# Patient Record
Sex: MALE | Race: WHITE | Employment: OTHER | URBAN - NONMETROPOLITAN AREA
[De-identification: names, ages, dates, MRNs, and addresses within clinical notes are randomized per-mention and may not be internally consistent; named-entity substitution may affect disease eponyms.]

---

## 2017-12-02 ENCOUNTER — HOSPITAL ENCOUNTER (EMERGENCY)
Age: 67
Discharge: HOME | End: 2017-12-02
Payer: MEDICARE

## 2017-12-02 VITALS
SYSTOLIC BLOOD PRESSURE: 132 MMHG | TEMPERATURE: 96.7 F | OXYGEN SATURATION: 97 % | RESPIRATION RATE: 18 BRPM | DIASTOLIC BLOOD PRESSURE: 84 MMHG | HEART RATE: 66 BPM

## 2017-12-02 DIAGNOSIS — S01.01XA LACERATION OF SCALP, INITIAL ENCOUNTER: Primary | ICD-10-CM

## 2017-12-02 PROCEDURE — 12002 RPR S/N/AX/GEN/TRNK2.6-7.5CM: CPT

## 2017-12-02 PROCEDURE — 99282 EMERGENCY DEPT VISIT SF MDM: CPT

## 2017-12-02 RX ORDER — VITAMIN E 268 MG
400 CAPSULE ORAL DAILY
COMMUNITY

## 2017-12-02 RX ORDER — TAMSULOSIN HYDROCHLORIDE 0.4 MG/1
0.4 CAPSULE ORAL DAILY
COMMUNITY

## 2017-12-02 RX ORDER — ASCORBIC ACID 500 MG
500 TABLET ORAL DAILY
COMMUNITY

## 2017-12-02 RX ORDER — BACITRACIN, NEOMYCIN, POLYMYXIN B 400; 3.5; 5 [USP'U]/G; MG/G; [USP'U]/G
OINTMENT TOPICAL 2 TIMES DAILY
Status: DISCONTINUED | OUTPATIENT
Start: 2017-12-02 | End: 2017-12-02 | Stop reason: HOSPADM

## 2017-12-02 ASSESSMENT — ENCOUNTER SYMPTOMS
CHEST TIGHTNESS: 0
NAUSEA: 0
BLOOD IN STOOL: 0
EYE REDNESS: 0
SHORTNESS OF BREATH: 0
EYE DISCHARGE: 0
VOMITING: 0
COUGH: 0
ABDOMINAL PAIN: 0
CONSTIPATION: 0
RHINORRHEA: 0
WHEEZING: 0
BACK PAIN: 0
SORE THROAT: 0
DIARRHEA: 0

## 2017-12-02 NOTE — ED PROVIDER NOTES
(up to 7 for level 4, 8 or more for level 5)     ED Triage Vitals [12/02/17 1307]   BP Temp Temp Source Pulse Resp SpO2 Height Weight   (!) 171/120 96.7 °F (35.9 °C) Tympanic 66 18 97 % -- --       Physical Exam   Constitutional: He is oriented to person, place, and time. He appears well-developed and well-nourished. No distress. HENT:   Head: Normocephalic. Head is with laceration. Right Ear: External ear normal.   Left Ear: External ear normal.   Eyes: Conjunctivae are normal. Right eye exhibits no discharge. Left eye exhibits no discharge. No scleral icterus. Neck: Normal range of motion and full passive range of motion without pain. Neck supple. No spinous process tenderness and no muscular tenderness present. No neck rigidity. No tracheal deviation, no edema, no erythema and normal range of motion present. Cardiovascular: Normal rate, regular rhythm and intact distal pulses. Pulmonary/Chest: Effort normal. No stridor. No respiratory distress. He has no wheezes. He has no rales. Musculoskeletal: Normal range of motion. He exhibits no edema, tenderness or deformity. Neurological: He is alert and oriented to person, place, and time. He has normal strength. He is not disoriented. No cranial nerve deficit or sensory deficit. He displays a negative Romberg sign. GCS eye subscore is 4. GCS verbal subscore is 5. GCS motor subscore is 6. Patient is awake alert and oriented to person place time situation. Cranial nerves II through XII grossly intact. 5 out of 5  strength bilateral upper extremity is. Skin: Skin is warm and dry. No rash noted. He is not diaphoretic. No erythema. Psychiatric: He has a normal mood and affect. His behavior is normal.   Nursing note and vitals reviewed.       DIAGNOSTIC RESULTS       EMERGENCY DEPARTMENT COURSE and DIFFERENTIAL DIAGNOSIS/MDM:   Vitals:    Vitals:    12/02/17 1307   BP: (!) 171/120   Pulse: 66   Resp: 18   Temp: 96.7 °F (35.9 °C)   TempSrc: Tympanic   SpO2: 97%         [facility]  70-year-old gentleman presents with laceration to top of this had onset just prior to arrival.  Patient was showing a house when he tripped on the step and hit his head on the wall. Denies any loss of consciousness. On exam does have about a 4 similar laceration. Depth is minimal.  There is no bony instability. He has no neurological deficits. Discussed with him CT had for further evaluation to rule out a bleed or skull fracture, foreign body patient declines a states that he feels well and just wants to be repaired and go home. He is up-to-date on his tetanus shot. He is a risk of not undergoing a CT second or to this wound. He is agreeable to this he understands the risk include bleeding and death. Will repair    Patient tolerated procedure well without any complications. He does understand strict and specific return warnings specifically pertaining to this head injury. He verbalized agreement is plan questions have been answered at length. Wound is then dressed with bacitracin. He will follow-up as instructed he will otherwise return immediately to the ER with any new or worsening complaints. He understands the same staples need to be removed in     Lac Repair  Date/Time: 12/2/2017 1:43 PM  Performed by: Jamee Dacosta by: Al Course     Consent:     Consent obtained:  Verbal    Consent given by:  Patient    Risks discussed:  Infection, need for additional repair, nerve damage, poor wound healing, poor cosmetic result, pain, retained foreign body, tendon damage and vascular damage    Alternatives discussed:  No treatment  Anesthesia (see MAR for exact dosages):      Anesthesia method:  None  Laceration details:     Location:  Scalp    Scalp location:  Mid-scalp    Length (cm):  4  Repair type:     Repair type:  Simple  Pre-procedure details:     Preparation:  Patient was prepped and draped in usual sterile fashion  Exploration:     Hemostasis

## 2018-01-01 ENCOUNTER — HOSPITAL ENCOUNTER (EMERGENCY)
Age: 68
Discharge: HOME | End: 2018-01-01
Attending: EMERGENCY MEDICINE
Payer: MEDICARE

## 2018-01-01 VITALS
HEART RATE: 58 BPM | OXYGEN SATURATION: 100 % | SYSTOLIC BLOOD PRESSURE: 138 MMHG | TEMPERATURE: 97.3 F | RESPIRATION RATE: 16 BRPM | DIASTOLIC BLOOD PRESSURE: 83 MMHG

## 2018-01-01 DIAGNOSIS — N39.0 URINARY TRACT INFECTION WITHOUT HEMATURIA, SITE UNSPECIFIED: Primary | ICD-10-CM

## 2018-01-01 LAB
-: ABNORMAL
AMORPHOUS: ABNORMAL
BACTERIA: ABNORMAL
BILIRUBIN URINE: ABNORMAL
CASTS UA: ABNORMAL /LPF
COLOR: YELLOW
COMMENT UA: ABNORMAL
CRYSTALS, UA: ABNORMAL /HPF
EPITHELIAL CELLS UA: ABNORMAL /HPF (ref 0–5)
GLUCOSE URINE: NEGATIVE
KETONES, URINE: ABNORMAL
LEUKOCYTE ESTERASE, URINE: ABNORMAL
MUCUS: ABNORMAL
NITRITE, URINE: POSITIVE
OTHER OBSERVATIONS UA: ABNORMAL
PH UA: 5 (ref 5–9)
PROTEIN UA: ABNORMAL
RBC UA: ABNORMAL /HPF (ref 0–2)
RENAL EPITHELIAL, UA: ABNORMAL /HPF
SPECIFIC GRAVITY UA: >1.03 (ref 1.01–1.02)
TRICHOMONAS: ABNORMAL
TURBIDITY: ABNORMAL
URINE HGB: ABNORMAL
UROBILINOGEN, URINE: NORMAL
WBC UA: ABNORMAL /HPF (ref 0–5)
YEAST: ABNORMAL

## 2018-01-01 PROCEDURE — 87186 SC STD MICRODIL/AGAR DIL: CPT

## 2018-01-01 PROCEDURE — 87088 URINE BACTERIA CULTURE: CPT

## 2018-01-01 PROCEDURE — 6360000002 HC RX W HCPCS: Performed by: PHYSICIAN ASSISTANT

## 2018-01-01 PROCEDURE — 81001 URINALYSIS AUTO W/SCOPE: CPT

## 2018-01-01 PROCEDURE — 87086 URINE CULTURE/COLONY COUNT: CPT

## 2018-01-01 PROCEDURE — 99283 EMERGENCY DEPT VISIT LOW MDM: CPT

## 2018-01-01 PROCEDURE — 96372 THER/PROPH/DIAG INJ SC/IM: CPT

## 2018-01-01 RX ORDER — CEPHALEXIN 500 MG/1
500 CAPSULE ORAL 3 TIMES DAILY
Qty: 21 CAPSULE | Refills: 0 | Status: SHIPPED | OUTPATIENT
Start: 2018-01-01 | End: 2018-01-08

## 2018-01-01 RX ORDER — CEFTRIAXONE 500 MG/1
500 INJECTION, POWDER, FOR SOLUTION INTRAMUSCULAR; INTRAVENOUS ONCE
Status: COMPLETED | OUTPATIENT
Start: 2018-01-01 | End: 2018-01-01

## 2018-01-01 RX ADMIN — CEFTRIAXONE SODIUM 500 MG: 500 INJECTION, POWDER, FOR SOLUTION INTRAMUSCULAR; INTRAVENOUS at 13:41

## 2018-01-01 ASSESSMENT — ENCOUNTER SYMPTOMS
NAUSEA: 0
TROUBLE SWALLOWING: 0
VOMITING: 0
RHINORRHEA: 0
SINUS PRESSURE: 0
DIARRHEA: 0
BACK PAIN: 0
EYE DISCHARGE: 0
COUGH: 0
EYE PAIN: 0
WHEEZING: 0
ABDOMINAL PAIN: 0

## 2018-01-01 NOTE — ED PROVIDER NOTES
is no rebound. Musculoskeletal: He exhibits no edema. Lymphadenopathy:     He has no cervical adenopathy. Neurological: He is alert and oriented to person, place, and time. No cranial nerve deficit. Skin: No rash noted. Psychiatric: He has a normal mood and affect. His behavior is normal.   Nursing note and vitals reviewed. DIAGNOSTIC RESULTS     EKG: (none if blank)  All EKG's are interpreted by the Emergency Department Physician who either signs or Co-signs this chart in the absence of a cardiologist.      RADIOLOGY: (none if blank)   Interpretation per the Radiologist below, if available at the time of this note:    No orders to display       LABS:  Labs Reviewed   UA W/REFLEX CULTURE - Abnormal; Notable for the following:        Result Value    Turbidity UA TURBID (*)     Bilirubin Urine MODERATE (*)     Ketones, Urine TRACE (*)     Specific Gravity, UA >1.030 (*)     Urine Hgb 3+ (*)     Protein, UA 3+ (*)     Nitrite, Urine POSITIVE (*)     Leukocyte Esterase, Urine MODERATE (*)     All other components within normal limits   MICROSCOPIC URINALYSIS - Abnormal; Notable for the following:     Bacteria, UA 3+ (*)     All other components within normal limits   URINE CULTURE       All other labs were within normal range or not returned as of this dictation. EMERGENCY DEPARTMENT COURSE and Medical Decision Making:     MDM/  ED Course      Patient was given his results. He requested a shot of antibiotics was given Rocephin. He refuses any pain medication. Strict return precautions and follow up instructions were discussed with the patient with which the patient agrees    CONSULTS: (None if blank)  None    Procedures: (None if blank)       CLINICAL IMPRESSION      1.  Urinary tract infection without hematuria, site unspecified          DISPOSITION/PLAN   DISPOSITION Discharge - Pending Orders Complete 01/01/2018 01:19:44 PM      PATIENT REFERRED TO:  MD Eduarda Hatch [address]    In 1 week        DISCHARGE MEDICATIONS:  New Prescriptions    CEPHALEXIN (KEFLEX) 500 MG CAPSULE    Take 1 capsule by mouth 3 times daily for 7 days              (Please note that portions of this note were completed with a voice recognition program.  Efforts were made to edit the dictations but occasionally words are mis-transcribed.)      Karyle Coaster May, PA-C (electronically signed)  Attending Emergency Physician            Karyle Coaster May, PA-C  01/01/18 [id number]

## 2018-01-03 LAB
CULTURE: ABNORMAL
CULTURE: ABNORMAL
Lab: ABNORMAL
ORGANISM: ABNORMAL
SPECIMEN DESCRIPTION: ABNORMAL
SPECIMEN DESCRIPTION: ABNORMAL
STATUS: ABNORMAL

## 2018-11-12 ENCOUNTER — HOSPITAL ENCOUNTER (OUTPATIENT)
Age: 68
Discharge: HOME | End: 2018-11-12
Payer: MEDICARE

## 2018-11-12 PROCEDURE — 84154 ASSAY OF PSA FREE: CPT

## 2018-11-12 PROCEDURE — 36415 COLL VENOUS BLD VENIPUNCTURE: CPT

## 2018-11-13 LAB
PROSTATE SPECIFIC ANTIGEN FREE: 0.9 UG/L
PROSTATE SPECIFIC ANTIGEN PERCENT FREE: 7 %
PROSTATE SPECIFIC ANTIGEN: 12.9 UG/L (ref 0–4)

## 2019-10-22 ENCOUNTER — HOSPITAL ENCOUNTER (EMERGENCY)
Age: 69
Discharge: HOME | End: 2019-10-22
Attending: EMERGENCY MEDICINE
Payer: MEDICARE

## 2019-10-22 VITALS
WEIGHT: 272 LBS | OXYGEN SATURATION: 97 % | DIASTOLIC BLOOD PRESSURE: 84 MMHG | RESPIRATION RATE: 16 BRPM | SYSTOLIC BLOOD PRESSURE: 124 MMHG | HEART RATE: 64 BPM | TEMPERATURE: 99.8 F

## 2019-10-22 DIAGNOSIS — N30.00 ACUTE CYSTITIS WITHOUT HEMATURIA: Primary | ICD-10-CM

## 2019-10-22 DIAGNOSIS — R33.9 URINARY RETENTION: ICD-10-CM

## 2019-10-22 LAB
-: ABNORMAL
AMORPHOUS: ABNORMAL
BACTERIA: ABNORMAL
BILIRUBIN URINE: NEGATIVE
CASTS UA: ABNORMAL /LPF
COLOR: YELLOW
COMMENT UA: ABNORMAL
CRYSTALS, UA: ABNORMAL /HPF
EPITHELIAL CELLS UA: ABNORMAL /HPF (ref 0–5)
GLUCOSE URINE: ABNORMAL
KETONES, URINE: NEGATIVE
LEUKOCYTE ESTERASE, URINE: ABNORMAL
MUCUS: ABNORMAL
NITRITE, URINE: NEGATIVE
OTHER OBSERVATIONS UA: ABNORMAL
PH UA: 6 (ref 5–9)
PROTEIN UA: ABNORMAL
RBC UA: ABNORMAL /HPF (ref 0–2)
RENAL EPITHELIAL, UA: ABNORMAL /HPF
SPECIFIC GRAVITY UA: 1.02 (ref 1.01–1.02)
TRICHOMONAS: ABNORMAL
TURBIDITY: ABNORMAL
URINE HGB: ABNORMAL
UROBILINOGEN, URINE: NORMAL
WBC UA: ABNORMAL /HPF (ref 0–5)
YEAST: ABNORMAL

## 2019-10-22 PROCEDURE — 99283 EMERGENCY DEPT VISIT LOW MDM: CPT

## 2019-10-22 PROCEDURE — 87086 URINE CULTURE/COLONY COUNT: CPT

## 2019-10-22 PROCEDURE — 81001 URINALYSIS AUTO W/SCOPE: CPT

## 2019-10-22 PROCEDURE — 96372 THER/PROPH/DIAG INJ SC/IM: CPT

## 2019-10-22 PROCEDURE — 6360000002 HC RX W HCPCS: Performed by: EMERGENCY MEDICINE

## 2019-10-22 PROCEDURE — 87077 CULTURE AEROBIC IDENTIFY: CPT

## 2019-10-22 PROCEDURE — 87186 SC STD MICRODIL/AGAR DIL: CPT

## 2019-10-22 RX ORDER — CEFTRIAXONE 1 G/1
1 INJECTION, POWDER, FOR SOLUTION INTRAMUSCULAR; INTRAVENOUS ONCE
Status: COMPLETED | OUTPATIENT
Start: 2019-10-22 | End: 2019-10-22

## 2019-10-22 RX ORDER — CIPROFLOXACIN 500 MG/1
500 TABLET, FILM COATED ORAL 2 TIMES DAILY
Qty: 20 TABLET | Refills: 0 | Status: SHIPPED | OUTPATIENT
Start: 2019-10-22 | End: 2019-11-01

## 2019-10-22 RX ADMIN — CEFTRIAXONE 1 G: 1 INJECTION, POWDER, FOR SOLUTION INTRAMUSCULAR; INTRAVENOUS at 16:04

## 2019-10-22 ASSESSMENT — PAIN SCALES - GENERAL: PAINLEVEL_OUTOF10: 9

## 2019-10-22 ASSESSMENT — ENCOUNTER SYMPTOMS
SHORTNESS OF BREATH: 0
BACK PAIN: 0
ABDOMINAL DISTENTION: 0
VOMITING: 0
COUGH: 0
CONSTIPATION: 0
COLOR CHANGE: 0
WHEEZING: 0
ABDOMINAL PAIN: 0

## 2019-10-22 ASSESSMENT — PAIN DESCRIPTION - DESCRIPTORS: DESCRIPTORS: ACHING

## 2019-10-22 ASSESSMENT — PAIN DESCRIPTION - PAIN TYPE: TYPE: ACUTE PAIN

## 2019-10-24 LAB
CULTURE: ABNORMAL
Lab: ABNORMAL
SPECIMEN DESCRIPTION: ABNORMAL

## 2023-03-07 ENCOUNTER — HOSPITAL ENCOUNTER (OUTPATIENT)
Dept: PHYSICAL THERAPY | Age: 73
Setting detail: THERAPIES SERIES
Discharge: HOME | End: 2023-03-07
Payer: MEDICARE

## 2023-03-07 DIAGNOSIS — M54.12 CERVICAL RADICULOPATHY: Primary | ICD-10-CM

## 2023-03-07 PROCEDURE — 97110 THERAPEUTIC EXERCISES: CPT

## 2023-03-07 PROCEDURE — 97161 PT EVAL LOW COMPLEX 20 MIN: CPT

## 2023-03-08 NOTE — PLAN OF CARE
[facility]           Phone: [phone]             Outpatient Physical Therapy  Fax: [phone]                                           Date: 3/7/2023  Patient: Vicente Mello : 1950 [facility] #: [id number]   Referring Physician: Silvio Malagon MD      [x] Plan of Care   [] Updated Plan of Care    Dates of Service to Include: 3/7/2023 to 23    Diagnosis:  M54.12 - cervical radiculopathy    Rehab (Treatment) Diagnosis:  neck pain, left shoulder pain, weakness             Onset Date:  23    Attendance  Total # of Visits to Date: 1 No Show: 0 Canceled Appointment: 0    Assessment  Body Structures, Functions, Activity Limitations Requiring Skilled Therapeutic Intervention: Decreased functional mobility , Decreased tolerance to work activity, Decreased posture, Increased pain, Decreased high-level IADLs, Decreased endurance, Decreased strength, Decreased ROM  Assessment: Pt is a 72 year old male being evaluated for skilled outpatient physical therapy for complaints of neck and left shoulder pain. Pt demosntrating reduction in cervical mobility with radicular symptoms to the left elbow and forearm. Pt denies radicular symptoms to the hand and fingers. Left upper extremity weakness noted compared to right UE, however pt presenting with bilateral weakness of the upper extremities. At this time pt to benefit from skilled outpatient physical therapy 2x per week for 6 weeks in order to faciltiate improved mobility and strength of upper extremities, cervical spine and postural muscles as needed for return to reported functional outcome.    Objective measures:   Strength RUE  R Shoulder Flexion: 4+/5  R Shoulder ABduction: 5/5  R Shoulder Internal Rotation: 4+/5  R Shoulder External Rotation: 4+/5  R Elbow Flexion: 5/5  R Elbow Extension: 5/5     Strength LUE  L Shoulder Flexion: 4/5  L Shoulder ABduction:  5/5  L Shoulder Internal Rotation: 4+/5  L Shoulder External Rotation: 4-/5  L Elbow Flexion: 5/5  L Elbow Extension: 4+/5    Special Tests:   Special Tests for Cervical Spine  Cervical Distraction Test: (-)  Impingement Tests  Neer Test: R (-), L (-)  Hawkin’s Gavin Test: L (+), R (-)  Lift-Off Test: L (+)  Yergason’s Test: L (+)  Labral Tests  O’Aram’s Test (Labrum): L (-), R (-)  Crank Test (Labrum): L (-), R (-)    Goals  Short Term Goals  Time Frame for Short Term Goals: 2 weeks  Short Term Goal 1: Pt will be educated on and initiated HEP for cervical and scapular mobility - met  Short Term Goal 2: Pt will tolerate 25 minutes of activity with pain less than 5/10 in order to improve ability to complete household tasks  Short Term Goal 3: Initate manual therapy as needed for tissue extensibility and reduction in stiffness as needed for improved mobility  Long Term Goals  Time Frame for Long Term Goals : 6 weeks  Long Term Goal 1: Pt will be independent and compliant with advanced HEP in order to self manage symptoms upon discharge  Long Term Goal 2: Pt will improve upper extremity strength to at least 5-/5 in order to improve ability to lift objects overhead  Long Term Goal 3: Pt will improve cervical rotation AROM to greater than 65 degrees in order to improve ability to look out side car window  Long Term Goal 4: Pt will improve NDI to less than 10 in order to improve ability to complete activities of daily living     Prognosis  Therapy Prognosis: Fair    Treatment Plan   Plan Frequency: 2x per week  Plan weeks: 4 weeks  [x] HP/CP      [x] Electrical Stim   [x] Therapeutic Exercise      [] Gait Training  [] Aquatics   [] Ultrasound         [x] Patient Education/HEP   [x] Manual Therapy  [] Traction    [x] Neuro-vaibhav        [x] Soft Tissue Mobs            [] Home TENS  [] Iontophoresis    [] Orthotic casting/fitting      [] Dry Needling  [] Blood Flow Restriction             Electronically signed by: Freya  Mark Mcfarland PT, DPT    Date: 3/7/2023      ______________________________________ Date: 3/7/2023   Physician Signature

## 2023-03-08 NOTE — PROGRESS NOTES
Phone: [address]          Fax: [phone]                      Outpatient Physical Therapy                                                                      Evaluation  Date: 3/7/2023  Patient: Odalis Mendieta  : 1950  [facility] #: [id number]    Referring Physician: Abhay Sanabria MD     Medical Diagnosis: M57.52 - cervical radiculopathy    Treatment Diagnosis: neck pain, left shoulder pain, weakness  Onset Date: 23  PT Insurance Information: [place] [facility] [place]  Total # of Visits Approved: 12   Total # of Visits to Date: 1  No Show: 0  Canceled Appointment: 0     Subjective  Subjective: Pt arrives with reports of neck and left shoulder pain that impacts his sleep and his ability get up from the bed due to pain. Pt has history of bilateral rotator cuff injuries. Pt reports pain moves from neck to, shoulder blade, and down to the left elbow. Pt reports radicular symptoms go down distal to elbow, denies radicular symptoms in to the hand and finger. Pt describes pain as dully, achy. Denies numbness and tingling. Pt reports pain at rest 5-6/10. Pt reports pain with movement 7/10. Pt biggest complaint is lack of strength preventing him from reaching overhead. Pt reports having not tried anything for pain management.   Additional Pertinent Hx: vision problems     Palpation:   Cervical Spine Palpation: pt reports mild tenderness to palpation of left upper trap, hypomobile segments left to right side glides  Right Shoulder Palpation: pt denies tenderness to palpation  Left Shoulder Palpation: pt denies tenderness to palpation      Objective    AROM       AROM LUE (degrees)  L Shoulder Flexion (0-180): 170  L Shoulder ABduction (0-180): 167  L Shoulder Int Rotation  (0-70): T12  L Shoulder Ext Rotation  (0-90): Suboccipital  L Elbow Flexion (0-145): WFL  L Elbow Extension (145-0): WFL      AROM RUE (degrees)  R Shoulder Flexion: 168  R Shoulder ABduction: 160  R Shoulder Internal Rotation: L3   R Shoulder External Rotation: T1  R Elbow Flexion: WFL  R Elbow Extension: [facility]         Strength       Strength RUE  R Shoulder Flexion: 4+/5  R Shoulder ABduction: 5/5  R Shoulder Internal Rotation: 4+/5  R Shoulder External Rotation: 4+/5  R Elbow Flexion: 5/5  R Elbow Extension: 5/5    Strength LUE  L Shoulder Flexion: 4/5  L Shoulder ABduction: 5/5  L Shoulder Internal Rotation: 4+/5  L Shoulder External Rotation: 4-/5  L Elbow Flexion: 5/5  L Elbow Extension: 4+/5       Cervical Assessment     AROM Cervical Spine   Measured as: Degrees  Cervical flexion: 49  Cervical extension: 40  Cervical right lateral: 15  Cervical left lateral: 20  Cervical right rotation: 50  Cervical left rotation: 38       Muscle Length/Flexibility: Muscle Length UE  Right Upper Trapezius: Tight  Left Upper Trapezius: Tight    Joint Mobility (if applicable):   Joint Integrity Cervical/Thoracic  C3: Hypomobile  C4: Hypomobile  C5: Hypomobile  C6: Hypomobile  C7: Hypomobile  Joint Integrity Shoulder  Left AP GHJ Glide: Hypomobile  Left PA GHJ Glide: Hypomobile  Left Inferior GHJ Glide: Hypomobile  Left Distal ACJ Posterior Glide: Hypomobile  Left Distal ACJ Inferior Glide: Hypomobile  Left Scapulothoracic Mobility: Hypomobile    Special Tests:   Special Tests for Cervical Spine  Cervical Distraction Test: (-)  Impingement Tests  Neer Test: R (-), L (-)  Hawkins Gavin Test: L (+), R (-)  Lift-Off Test: L (+)  Yergasons Test: L (+)  Labral Tests  OArams Test (Labrum): L (-), R (-)  Crank Test (Labrum): L (-), R (-)                        Exercises:  Exercise 1: HEP: Supine AROM cervical rotation, Seated hervical rotation with nod, scaption wall slide with towel, no money (no resistance)            Functional Outcome Measures     Section 1: Pain Intensity: The pain is moderate at the moment  Section 2: Personal Care (Washing, Dressing, etc.): I can look after myself normally but it causes extra pain  Section 3: Lifting: Pain prevents me lifting heavy weights off the floor, but I can manage if they are conveniently positioned, for example on a table  Section 4: Reading: I can read as much as I want to with slight pain in my neck  Section 5: Headaches: I have no headaches at all  Section 6: Concentration: I can concentrate fully when I want to with no difficulty  Section 7: Work: I can do most of my usual work, but no more  Section 8: Driving: I can drive my car as long as I want with moderate pain in my neck  Section 9: Sleeping: My sleep is mildly disturbed (1-2 hours sleepless)  Section 10: Recreation: I am able to engage in most, but not all, of my usual recreation activities because of pain in my neck  Neck Disability Index Raw Score: 14                                                    Assessment  Body Structures, Functions, Activity Limitations Requiring Skilled Therapeutic Intervention: Decreased functional mobility , Decreased tolerance to work activity, Decreased posture, Increased pain, Decreased high-level IADLs, Decreased endurance, Decreased strength, Decreased ROM  Assessment: Pt is a 67year old male being evaluated for skilled outpatient physical therapy for complaints of neck and left shoulder pain. Pt demosntrating reduction in cervical mobility with radicular symptoms to the left elbow and forearm. Pt denies radicular symptoms to the hand and fingers. Left upper extremity weakness noted compared to right UE, however pt presenting with bilateral weakness of the upper extremities. At this time pt to benefit from skilled outpatient physical therapy 2x per week for 6 weeks in order to faciltiate improved mobility and strength of upper extremities, cervical spine and postural muscles as needed for return to reported functional outcome.   Therapy Prognosis: Fair        Decision Making: Low Complexity    Patient Education  Patient Education: Pt educated on HEP and plan of care  Pt verbalized/demonstrated good understanding:     [X] Yes         [] No, pt required further clarification.       Goals  Short Term Goals  Time Frame for Short Term Goals: 2 weeks  Short Term Goal 1: Pt will be educated on and initiated HEP for cervical and scapular mobility - met  Short Term Goal 2: Pt will tolerate 25 minutes of activity with pain less than 5/10 in order to improve ability to complete household tasks  Short Term Goal 3: Initate manual therapy as needed for tissue extensibility and reduction in stiffness as needed for improved mobility    Long Term Goals  Time Frame for Long Term Goals : 6 weeks  Long Term Goal 1: Pt will be independent and compliant with advanced HEP in order to self manage symptoms upon discharge  Long Term Goal 2: Pt will improve upper extremity strength to at least 5-/5 in order to improve ability to lift objects overhead  Long Term Goal 3: Pt will improve cervical rotation AROM to greater than 65 degrees in order to improve ability to look out side car window  Long Term Goal 4: Pt will improve NDI to less than 10 in order to improve ability to complete activities of daily living      Patient Goals : get better golf swing        Minutes Tracking:  Time In: 0845  Time Out: 0935  Minutes: 50  Timed Code Treatment Minutes: [address], [place], DPT    3/7/2023

## 2023-03-14 ENCOUNTER — HOSPITAL ENCOUNTER (OUTPATIENT)
Dept: PHYSICAL THERAPY | Age: 73
Setting detail: THERAPIES SERIES
Discharge: HOME | End: 2023-03-14
Payer: MEDICARE

## 2023-03-14 PROCEDURE — 97110 THERAPEUTIC EXERCISES: CPT

## 2023-03-14 NOTE — PROGRESS NOTES
Phone: [phone]                 [facility]           Fax: [phone]                           Outpatient Physical Therapy                                                                            Daily Note    Patient: Vicente Mello : 1950  [facility] #: [id number]   Referring Physician: Silvio Malagon MD    Date: 3/14/2023    Diagnosis: M54.12 - cervical radiculopathy  Treatment Diagnosis: neck pain, left shoulder pain, weakness    Onset Date: 23  PT Insurance Information: Medicare Part A and B / [employer]  Total # of Visits Approved: 12 Per Physician Order  Total # of Visits to Date: 2  No Show: 0  Canceled Appointment: 0      Pre-Treatment Pain:  3/10  Subjective: Pt arrives with reports of mild pain in the left upper extremity. Denies neck pain    Exercises:  Exercise 1: HEP: Supine AROM cervical rotation, Seated cervical rotation with nod, scaption wall slide with towel, no money (no resistance)  Exercise 2: UBE level 1 backwards 7 minutes  Exercise 3: Upper trap stretch 2 x 30 seconds bilateral ; levator scapula stretch 2 x 30 seconds  Exercise 4: shoulder blade squeeze 2 x 10  Exercise 5: Seated cervical rotation with nod 3 x 10 bilateral  Exercise 6: no monies 3 x 10  Exercise 7: Scaption wall slide with pillow case 3 x 10  Exercise 8: ball roll out 30x  Exercise 9: supine cervical rotation 3 x 10  Exercise 10: AROM supine shoulder flexion 3 pound dowel 3 x 10         Modalities:   10 minutes - heat pack to cervical spine and bilateral shoulders in order to facilitate tissue extensibility and reduce post treatment soreness     Assessment  Body Structures, Functions, Activity Limitations Requiring Skilled Therapeutic Intervention: Decreased functional mobility , Decreased tolerance to work activity, Decreased posture, Increased pain, Decreased high-level IADLs, Decreased endurance, Decreased strength, Decreased ROM  Assessment: Pt arrived with reports of mild pain. Therapist  directed pt through treatment in order to faciliate postural strength and mobility within pain free range of motion. Pt able to perform all exercises without increase in pain. Mild fatigue noted with increased repetitions that reduced at rest. Therapist provided verbal cuing for form and visual demonstration with new exercises. Therapist ended treatment with use of heat pack to cervical spine and bilateral shoulders in order to faciltate tissue extensibility and reduce post treatment soreness. Pt ended treatment without pain. Activity Tolerance  Activity Tolerance: Patient tolerated evaluation without incident    Patient Education  Patient Education: Pt educated on updated exercises  Pt verbalized/demonstrated good understanding:     [x] Yes         [] No, pt required further clarification.        Post Treatment Pain:  0/10      Plan  Plan Frequency: 2x per week  Plan weeks: 4 weeks       Goals  (Total # of Visits to Date: 2)      Short Term Goals  Time Frame for Short Term Goals: 2 weeks  Short Term Goal 1: Pt will be educated on and initiated HEP for cervical and scapular mobility - met  Short Term Goal 2: Pt will tolerate 25 minutes of activity with pain less than 5/10 in order to improve ability to complete household tasks  Short Term Goal 3: Initate manual therapy as needed for tissue extensibility and reduction in stiffness as needed for improved mobility    Long Term Goals  Time Frame for Long Term Goals : 6 weeks  Long Term Goal 1: Pt will be independent and compliant with advanced HEP in order to self manage symptoms upon discharge  Long Term Goal 2: Pt will improve upper extremity strength to at least 5-/5 in order to improve ability to lift objects overhead  Long Term Goal 3: Pt will improve cervical rotation AROM to greater than 65 degrees in order to improve ability to look out side car window  Long Term Goal 4: Pt will improve NDI to less than 10 in order to improve ability to complete activities of daily living    Minutes Tracking:  Time In: 0830  Time Out: [facility]  Minutes: 55  Timed Code Treatment Minutes: [address], [place], DP     Date: 3/14/2023

## 2023-03-17 ENCOUNTER — HOSPITAL ENCOUNTER (OUTPATIENT)
Dept: PHYSICAL THERAPY | Age: 73
Setting detail: THERAPIES SERIES
Discharge: HOME | End: 2023-03-17
Payer: MEDICARE

## 2023-03-17 PROCEDURE — 97110 THERAPEUTIC EXERCISES: CPT

## 2023-03-17 PROCEDURE — 97140 MANUAL THERAPY 1/> REGIONS: CPT

## 2023-03-21 ENCOUNTER — HOSPITAL ENCOUNTER (OUTPATIENT)
Dept: PHYSICAL THERAPY | Age: 73
Setting detail: THERAPIES SERIES
Discharge: HOME | End: 2023-03-21
Payer: MEDICARE

## 2023-03-21 PROCEDURE — 97110 THERAPEUTIC EXERCISES: CPT

## 2023-03-21 NOTE — PROGRESS NOTES
Phone: [place]           Fax: [phone]                           Outpatient Physical Therapy                                                                            Daily Note    Patient: Rodolfo Aguilar : 1950  [facility] #: [id number]   Referring Physician: Army Abbie MD    Date: 3/21/2023    Diagnosis: M54.12 - cervical radiculopathy  Treatment Diagnosis: neck pain, left shoulder pain, weakness    Onset Date: 23  PT Insurance Information: Medicare Part A and B / [facility] [place]  Total # of Visits Approved: 12 Per Physician Order  Total # of Visits to Date: 4  No Show: 0  Canceled Appointment: 0      Pre-Treatment Pain:  2/10  Subjective: \"oh it's the same old thing, I dont think anything will make it better\". Pt reports usual pain and stiffness in neck and shoulder    Exercises:  Exercise 2: UBE level 1 backwards 6 minutes  Exercise 5: Seated cervical rotation with nod 3 x 10 bilateral  Exercise 6: no monies 3 x 10  Exercise 7: Scaption wall slide with pillow case 3 x 10  Exercise 8: ball roll out 30x  Exercise 11: Shoulder extension YTB 3 x 10 in reduced ROM  Exercise 12: Ball pass behind 3 x 15 CW / CCW  Exercise 13: joystick LUE only forward / back ; figure eight 30x each       Assessment  Body Structures, Functions, Activity Limitations Requiring Skilled Therapeutic Intervention: Decreased functional mobility , Decreased tolerance to work activity, Decreased posture, Increased pain, Decreased high-level IADLs, Decreased endurance, Decreased strength, Decreased ROM  Assessment: Pt arrivees with usualy reports of mild pain. Therapist continued to direct pt through treatment in order to facilitate left UE mobility and strength within current mobility. Pt able to demonstrate full flexion range of motion with active assist from RUE. Therapist able to progress pt with new exercises per activity log with good pt tolerance.  Therapist provided verbal cuing and

## 2023-03-24 ENCOUNTER — HOSPITAL ENCOUNTER (OUTPATIENT)
Dept: PHYSICAL THERAPY | Age: 73
Setting detail: THERAPIES SERIES
Discharge: HOME | End: 2023-03-24
Payer: MEDICARE

## 2023-03-24 PROCEDURE — 97110 THERAPEUTIC EXERCISES: CPT

## 2023-03-24 PROCEDURE — 97140 MANUAL THERAPY 1/> REGIONS: CPT

## 2023-03-24 NOTE — PROGRESS NOTES
will be educated on and initiated HEP for cervical and scapular mobility - met  Short Term Goal 2: Pt will tolerate 25 minutes of activity with pain less than 5/10 in order to improve ability to complete household tasks - met  Short Term Goal 3: Initate manual therapy as needed for tissue extensibility and reduction in stiffness as needed for improved mobility -MET    Long Term Goals  Time Frame for Long Term Goals : 6 weeks  Long Term Goal 1: Pt will be independent and compliant with advanced HEP in order to self manage symptoms upon discharge  Long Term Goal 2: Pt will improve upper extremity strength to at least 5-/5 in order to improve ability to lift objects overhead  Long Term Goal 3: Pt will improve cervical rotation AROM to greater than 65 degrees in order to improve ability to look out side car window  Long Term Goal 4: Pt will improve NDI to less than 10 in order to improve ability to complete activities of daily living    Minutes Tracking:  Time In: 0830  Time Out: 0900  Minutes: 30  Timed Code Treatment Minutes: Juanis Ac [address] [place] [address]     Date: 3/24/2023

## 2023-03-28 ENCOUNTER — HOSPITAL ENCOUNTER (OUTPATIENT)
Dept: PHYSICAL THERAPY | Age: 73
Setting detail: THERAPIES SERIES
Discharge: HOME | End: 2023-03-28
Payer: MEDICARE

## 2023-03-28 PROCEDURE — 97110 THERAPEUTIC EXERCISES: CPT

## 2023-03-28 PROCEDURE — 97140 MANUAL THERAPY 1/> REGIONS: CPT

## 2023-03-28 NOTE — PROGRESS NOTES
not. Added thoracic extensions to program as well with no adverse response. Plan to continue to progress pt as tolerated. Activity Tolerance  Activity Tolerance: Patient tolerated treatment well    Patient Education  Patient Education: Contiue CROM exercises at home within tolerance. Pt verbalized/demonstrated good understanding:     [x] Yes         [] No, pt required further clarification.        Post Treatment Pain:  0/10      Plan  Plan Frequency: 2x per week  Plan weeks: 4 weeks       Goals  (Total # of Visits to Date: 6)      Short Term Goals  Time Frame for Short Term Goals: 2 weeks  Short Term Goal 1: Pt will be educated on and initiated HEP for cervical and scapular mobility - met  Short Term Goal 2: Pt will tolerate 25 minutes of activity with pain less than 5/10 in order to improve ability to complete household tasks - met  Short Term Goal 3: Initate manual therapy as needed for tissue extensibility and reduction in stiffness as needed for improved mobility -MET    Long Term Goals  Time Frame for Long Term Goals : 6 weeks  Long Term Goal 1: Pt will be independent and compliant with advanced HEP in order to self manage symptoms upon discharge  Long Term Goal 2: Pt will improve upper extremity strength to at least 5-/5 in order to improve ability to lift objects overhead  Long Term Goal 3: Pt will improve cervical rotation AROM to greater than 65 degrees in order to improve ability to look out side car window  Long Term Goal 4: Pt will improve NDI to less than 10 in order to improve ability to complete activities of daily living    Minutes Tracking:  Time In: 0830  Time Out: 0915  Minutes: 45  Timed Code Treatment Minutes: [address], PT     Date: 3/28/2023

## 2023-03-31 ENCOUNTER — HOSPITAL ENCOUNTER (OUTPATIENT)
Dept: PHYSICAL THERAPY | Age: 73
Setting detail: THERAPIES SERIES
Discharge: HOME | End: 2023-03-31
Payer: MEDICARE

## 2023-03-31 PROCEDURE — 97110 THERAPEUTIC EXERCISES: CPT

## 2023-03-31 PROCEDURE — 97140 MANUAL THERAPY 1/> REGIONS: CPT

## 2023-03-31 NOTE — PROGRESS NOTES
Phone: Mercedes           Fax: [phone]                           Outpatient Physical Therapy                                                                            Daily Note    Patient: Saurabh Morse : 1950  CSN #: [id number]   Referring Physician: Areli Valadez MD    Date: 3/31/2023       Treatment Diagnosis: neck pain, left shoulder pain, weakness    Onset Date: 23  PT Insurance Information: Medicare Part A and B / [employer]  Total # of Visits Approved: 12 Per Physician Order  Total # of Visits to Date: 7  No Show: 0  Canceled Appointment: 0    Pre-Treatment Pain:  0/10  Subjective: Pt reports he has made some improvements since begining therapy but \"its still there\". Pt rates current pain a 0 at rest but catches with motion. Exercises:  Exercise 2: UBE level 1 backwards 6 minutes  Exercise 3: Upper trap stretch 2 x 30 seconds bilateral ; levator scapula stretch 2 x 30 seconds  Exercise 5: Seated cervical rotation with nod 3 x 10 bilateral  Exercise 9: supine cervical rotation 3 x 10  Exercise 14: yes/no 15x ea    Manual:  Manual Traction: gentle crevical traction in supine for discomfort / suboccipital release  Other: L shoulder post and inferior glides. Assessment  Assessment: Pt reports overall pain still comes and goes and he has his bad days and his good days but they are inconsistent. Pt reports he has made slight improvements with therapy but its not all the way gone. Pt reports pain ranges on day to day basis from a 0 to a 6/10 but when it is bad he \"just cant use the arm\". Current CROM FLex: 36*, Ext 30*, R rot 52* and L rot 40*. Pt displays full L shoulder AROM but feels pain at mid range and with eccentric return to neutral.  Pt to RTD Monday for a follow-up regarding neck and shoulder.     Activity Tolerance  Activity Tolerance: Patient tolerated treatment well    Patient Education  Patient Education: Baron Almanza

## 2024-09-06 ENCOUNTER — ANESTHESIA EVENT (OUTPATIENT)
Dept: OPERATING ROOM | Age: 74
End: 2024-09-06
Payer: MEDICARE

## 2024-09-08 PROBLEM — H25.812 COMBINED FORMS OF AGE-RELATED CATARACT OF LEFT EYE: Chronic | Status: ACTIVE | Noted: 2024-09-08

## 2024-09-09 ENCOUNTER — ANESTHESIA (OUTPATIENT)
Dept: OPERATING ROOM | Age: 74
End: 2024-09-09
Payer: MEDICARE

## 2024-09-09 ENCOUNTER — HOSPITAL ENCOUNTER (OUTPATIENT)
Age: 74
Setting detail: OUTPATIENT SURGERY
Discharge: HOME | End: 2024-09-09
Attending: OPHTHALMOLOGY | Admitting: OPHTHALMOLOGY
Payer: MEDICARE

## 2024-09-09 VITALS
TEMPERATURE: 97.6 F | RESPIRATION RATE: 18 BRPM | OXYGEN SATURATION: 97 % | BODY MASS INDEX: 30.59 KG/M2 | SYSTOLIC BLOOD PRESSURE: 128 MMHG | WEIGHT: 246 LBS | DIASTOLIC BLOOD PRESSURE: 71 MMHG | HEART RATE: 49 BPM | HEIGHT: 75 IN

## 2024-09-09 PROBLEM — H25.812 COMBINED FORMS OF AGE-RELATED CATARACT OF LEFT EYE: Chronic | Status: RESOLVED | Noted: 2024-09-08 | Resolved: 2024-09-09

## 2024-09-09 PROCEDURE — 3700000001 HC ADD 15 MINUTES (ANESTHESIA): Performed by: OPHTHALMOLOGY

## 2024-09-09 PROCEDURE — 6370000000 HC RX 637 (ALT 250 FOR IP): Performed by: OPHTHALMOLOGY

## 2024-09-09 PROCEDURE — 3700000000 HC ANESTHESIA ATTENDED CARE: Performed by: OPHTHALMOLOGY

## 2024-09-09 PROCEDURE — 6360000002 HC RX W HCPCS: Performed by: NURSE ANESTHETIST, CERTIFIED REGISTERED

## 2024-09-09 PROCEDURE — 7100000010 HC PHASE II RECOVERY - FIRST 15 MIN: Performed by: OPHTHALMOLOGY

## 2024-09-09 PROCEDURE — V2787 ASTIGMATISM-CORRECT FUNCTION: HCPCS | Performed by: OPHTHALMOLOGY

## 2024-09-09 PROCEDURE — 2709999900 HC NON-CHARGEABLE SUPPLY: Performed by: OPHTHALMOLOGY

## 2024-09-09 PROCEDURE — 3600000013 HC SURGERY LEVEL 3 ADDTL 15MIN: Performed by: OPHTHALMOLOGY

## 2024-09-09 PROCEDURE — 2580000003 HC RX 258: Performed by: OPHTHALMOLOGY

## 2024-09-09 PROCEDURE — 2500000003 HC RX 250 WO HCPCS: Performed by: OPHTHALMOLOGY

## 2024-09-09 PROCEDURE — 3600000003 HC SURGERY LEVEL 3 BASE: Performed by: OPHTHALMOLOGY

## 2024-09-09 PROCEDURE — 7100000011 HC PHASE II RECOVERY - ADDTL 15 MIN: Performed by: OPHTHALMOLOGY

## 2024-09-09 DEVICE — CLAREON TORIC UVA
Type: IMPLANTABLE DEVICE | Site: EYE | Status: FUNCTIONAL
Brand: CLAREON

## 2024-09-09 RX ORDER — TROPICAMIDE 10 MG/ML
1 SOLUTION/ DROPS OPHTHALMIC SEE ADMIN INSTRUCTIONS
Status: DISCONTINUED | OUTPATIENT
Start: 2024-09-09 | End: 2024-09-09 | Stop reason: HOSPADM

## 2024-09-09 RX ORDER — SODIUM CHLORIDE, SODIUM LACTATE, POTASSIUM CHLORIDE, CALCIUM CHLORIDE 600; 310; 30; 20 MG/100ML; MG/100ML; MG/100ML; MG/100ML
INJECTION, SOLUTION INTRAVENOUS CONTINUOUS
Status: DISCONTINUED | OUTPATIENT
Start: 2024-09-09 | End: 2024-09-09 | Stop reason: HOSPADM

## 2024-09-09 RX ORDER — LIDOCAINE HYDROCHLORIDE 10 MG/ML
INJECTION, SOLUTION EPIDURAL; INFILTRATION; INTRACAUDAL; PERINEURAL PRN
Status: DISCONTINUED | OUTPATIENT
Start: 2024-09-09 | End: 2024-09-09 | Stop reason: ALTCHOICE

## 2024-09-09 RX ORDER — SODIUM CHLORIDE 9 MG/ML
INJECTION, SOLUTION INTRAVENOUS PRN
Status: DISCONTINUED | OUTPATIENT
Start: 2024-09-09 | End: 2024-09-09 | Stop reason: HOSPADM

## 2024-09-09 RX ORDER — SODIUM CHLORIDE 0.9 % (FLUSH) 0.9 %
5-40 SYRINGE (ML) INJECTION EVERY 12 HOURS SCHEDULED
Status: CANCELLED | OUTPATIENT
Start: 2024-09-09

## 2024-09-09 RX ORDER — SODIUM CHLORIDE 0.9 % (FLUSH) 0.9 %
5-40 SYRINGE (ML) INJECTION PRN
Status: DISCONTINUED | OUTPATIENT
Start: 2024-09-09 | End: 2024-09-09 | Stop reason: HOSPADM

## 2024-09-09 RX ORDER — SODIUM CHLORIDE 0.9 % (FLUSH) 0.9 %
5-40 SYRINGE (ML) INJECTION PRN
Status: CANCELLED | OUTPATIENT
Start: 2024-09-09

## 2024-09-09 RX ORDER — SODIUM CHLORIDE 9 MG/ML
INJECTION, SOLUTION INTRAVENOUS CONTINUOUS
Status: DISCONTINUED | OUTPATIENT
Start: 2024-09-09 | End: 2024-09-09 | Stop reason: HOSPADM

## 2024-09-09 RX ORDER — SODIUM CHLORIDE 9 MG/ML
INJECTION, SOLUTION INTRAVENOUS PRN
Status: CANCELLED | OUTPATIENT
Start: 2024-09-09

## 2024-09-09 RX ORDER — TETRACAINE HYDROCHLORIDE 5 MG/ML
SOLUTION OPHTHALMIC PRN
Status: DISCONTINUED | OUTPATIENT
Start: 2024-09-09 | End: 2024-09-09 | Stop reason: ALTCHOICE

## 2024-09-09 RX ORDER — PROPARACAINE HYDROCHLORIDE 5 MG/ML
1 SOLUTION/ DROPS OPHTHALMIC SEE ADMIN INSTRUCTIONS
Status: DISCONTINUED | OUTPATIENT
Start: 2024-09-09 | End: 2024-09-09 | Stop reason: HOSPADM

## 2024-09-09 RX ORDER — SODIUM CHLORIDE 0.9 % (FLUSH) 0.9 %
5-40 SYRINGE (ML) INJECTION EVERY 12 HOURS SCHEDULED
Status: DISCONTINUED | OUTPATIENT
Start: 2024-09-09 | End: 2024-09-09 | Stop reason: HOSPADM

## 2024-09-09 RX ORDER — NALOXONE HYDROCHLORIDE 0.4 MG/ML
INJECTION, SOLUTION INTRAMUSCULAR; INTRAVENOUS; SUBCUTANEOUS PRN
Status: CANCELLED | OUTPATIENT
Start: 2024-09-09

## 2024-09-09 RX ORDER — TETRACAINE HYDROCHLORIDE 5 MG/ML
1 SOLUTION OPHTHALMIC SEE ADMIN INSTRUCTIONS
Status: DISCONTINUED | OUTPATIENT
Start: 2024-09-09 | End: 2024-09-09 | Stop reason: HOSPADM

## 2024-09-09 RX ORDER — PHENYLEPHRINE HYDROCHLORIDE 25 MG/ML
1 SOLUTION/ DROPS OPHTHALMIC SEE ADMIN INSTRUCTIONS
Status: DISCONTINUED | OUTPATIENT
Start: 2024-09-09 | End: 2024-09-09 | Stop reason: HOSPADM

## 2024-09-09 RX ORDER — FENTANYL CITRATE 50 UG/ML
INJECTION, SOLUTION INTRAMUSCULAR; INTRAVENOUS PRN
Status: DISCONTINUED | OUTPATIENT
Start: 2024-09-09 | End: 2024-09-09 | Stop reason: SDUPTHER

## 2024-09-09 RX ADMIN — PHENYLEPHRINE HYDROCHLORIDE 1 DROP: 25 SOLUTION/ DROPS OPHTHALMIC at 10:15

## 2024-09-09 RX ADMIN — TROPICAMIDE 1 DROP: 10 SOLUTION/ DROPS OPHTHALMIC at 10:24

## 2024-09-09 RX ADMIN — TROPICAMIDE 1 DROP: 10 SOLUTION/ DROPS OPHTHALMIC at 10:07

## 2024-09-09 RX ADMIN — PHENYLEPHRINE HYDROCHLORIDE 1 DROP: 25 SOLUTION/ DROPS OPHTHALMIC at 10:07

## 2024-09-09 RX ADMIN — PROPARACAINE HYDROCHLORIDE 1 DROP: 5 SOLUTION/ DROPS OPHTHALMIC at 10:15

## 2024-09-09 RX ADMIN — PROPARACAINE HYDROCHLORIDE 1 DROP: 5 SOLUTION/ DROPS OPHTHALMIC at 10:29

## 2024-09-09 RX ADMIN — TROPICAMIDE 1 DROP: 10 SOLUTION/ DROPS OPHTHALMIC at 10:15

## 2024-09-09 RX ADMIN — PHENYLEPHRINE HYDROCHLORIDE 1 DROP: 25 SOLUTION/ DROPS OPHTHALMIC at 10:29

## 2024-09-09 RX ADMIN — SODIUM CHLORIDE: 9 INJECTION, SOLUTION INTRAVENOUS at 10:23

## 2024-09-09 RX ADMIN — PHENYLEPHRINE HYDROCHLORIDE 1 DROP: 25 SOLUTION/ DROPS OPHTHALMIC at 10:35

## 2024-09-09 RX ADMIN — TROPICAMIDE 1 DROP: 10 SOLUTION/ DROPS OPHTHALMIC at 10:35

## 2024-09-09 RX ADMIN — FENTANYL CITRATE 50 MCG: 50 INJECTION INTRAMUSCULAR; INTRAVENOUS at 10:48

## 2024-09-09 RX ADMIN — FENTANYL CITRATE 25 MCG: 50 INJECTION INTRAMUSCULAR; INTRAVENOUS at 10:49

## 2024-09-09 RX ADMIN — PROPARACAINE HYDROCHLORIDE 1 DROP: 5 SOLUTION/ DROPS OPHTHALMIC at 10:24

## 2024-09-09 RX ADMIN — PROPARACAINE HYDROCHLORIDE 1 DROP: 5 SOLUTION/ DROPS OPHTHALMIC at 10:35

## 2024-09-09 RX ADMIN — TROPICAMIDE 1 DROP: 10 SOLUTION/ DROPS OPHTHALMIC at 10:29

## 2024-09-09 RX ADMIN — TETRACAINE HYDROCHLORIDE 1 DROP: 5 SOLUTION OPHTHALMIC at 10:44

## 2024-09-09 RX ADMIN — PHENYLEPHRINE HYDROCHLORIDE 1 DROP: 25 SOLUTION/ DROPS OPHTHALMIC at 10:24

## 2024-09-09 RX ADMIN — PROPARACAINE HYDROCHLORIDE 1 DROP: 5 SOLUTION/ DROPS OPHTHALMIC at 10:07

## 2024-09-09 ASSESSMENT — PAIN - FUNCTIONAL ASSESSMENT
PAIN_FUNCTIONAL_ASSESSMENT: NONE - DENIES PAIN
PAIN_FUNCTIONAL_ASSESSMENT: NONE - DENIES PAIN

## 2024-11-05 NOTE — PROGRESS NOTES
Patient received NPO instructions and pre-op medication instructions to be taken on the day of the procedure with a small sip of water. Pt was also given pre-op eye drop instructions from Dr. Luna's office.

## 2024-11-08 ENCOUNTER — ANESTHESIA EVENT (OUTPATIENT)
Dept: OPERATING ROOM | Age: 74
End: 2024-11-08
Payer: MEDICARE

## 2024-11-10 PROBLEM — H25.811 COMBINED FORMS OF AGE-RELATED CATARACT OF RIGHT EYE: Chronic | Status: ACTIVE | Noted: 2024-11-10

## 2024-11-11 ENCOUNTER — HOSPITAL ENCOUNTER (OUTPATIENT)
Age: 74
Setting detail: OUTPATIENT SURGERY
Discharge: HOME | End: 2024-11-11
Attending: OPHTHALMOLOGY | Admitting: OPHTHALMOLOGY
Payer: MEDICARE

## 2024-11-11 ENCOUNTER — ANESTHESIA (OUTPATIENT)
Dept: OPERATING ROOM | Age: 74
End: 2024-11-11
Payer: MEDICARE

## 2024-11-11 VITALS
HEIGHT: 75 IN | WEIGHT: 249 LBS | SYSTOLIC BLOOD PRESSURE: 129 MMHG | TEMPERATURE: 97.1 F | BODY MASS INDEX: 30.96 KG/M2 | DIASTOLIC BLOOD PRESSURE: 75 MMHG | HEART RATE: 58 BPM | OXYGEN SATURATION: 93 % | RESPIRATION RATE: 18 BRPM

## 2024-11-11 PROBLEM — H25.811 COMBINED FORMS OF AGE-RELATED CATARACT OF RIGHT EYE: Chronic | Status: RESOLVED | Noted: 2024-11-10 | Resolved: 2024-11-11

## 2024-11-11 PROCEDURE — 6360000002 HC RX W HCPCS: Performed by: NURSE ANESTHETIST, CERTIFIED REGISTERED

## 2024-11-11 PROCEDURE — 6370000000 HC RX 637 (ALT 250 FOR IP): Performed by: OPHTHALMOLOGY

## 2024-11-11 PROCEDURE — 6360000002 HC RX W HCPCS: Performed by: OPHTHALMOLOGY

## 2024-11-11 PROCEDURE — 2500000003 HC RX 250 WO HCPCS: Performed by: OPHTHALMOLOGY

## 2024-11-11 RX ORDER — SODIUM CHLORIDE, SODIUM LACTATE, POTASSIUM CHLORIDE, CALCIUM CHLORIDE 600; 310; 30; 20 MG/100ML; MG/100ML; MG/100ML; MG/100ML
INJECTION, SOLUTION INTRAVENOUS CONTINUOUS
Status: DISCONTINUED | OUTPATIENT
Start: 2024-11-11 | End: 2024-11-11 | Stop reason: HOSPADM

## 2024-11-11 RX ORDER — SODIUM CHLORIDE 0.9 % (FLUSH) 0.9 %
5-40 SYRINGE (ML) INJECTION PRN
Status: DISCONTINUED | OUTPATIENT
Start: 2024-11-11 | End: 2024-11-11 | Stop reason: HOSPADM

## 2024-11-11 RX ORDER — SODIUM CHLORIDE 9 MG/ML
INJECTION, SOLUTION INTRAVENOUS PRN
Status: DISCONTINUED | OUTPATIENT
Start: 2024-11-11 | End: 2024-11-11 | Stop reason: HOSPADM

## 2024-11-11 RX ORDER — SODIUM CHLORIDE 0.9 % (FLUSH) 0.9 %
5-40 SYRINGE (ML) INJECTION EVERY 12 HOURS SCHEDULED
Status: DISCONTINUED | OUTPATIENT
Start: 2024-11-11 | End: 2024-11-11 | Stop reason: HOSPADM

## 2024-11-11 RX ORDER — FENTANYL CITRATE 50 UG/ML
INJECTION, SOLUTION INTRAMUSCULAR; INTRAVENOUS
Status: DISCONTINUED | OUTPATIENT
Start: 2024-11-11 | End: 2024-11-11 | Stop reason: SDUPTHER

## 2024-11-11 RX ORDER — ESOMEPRAZOLE MAGNESIUM 40 MG/1
40 GRANULE, DELAYED RELEASE ORAL DAILY
COMMUNITY

## 2024-11-11 RX ORDER — NALOXONE HYDROCHLORIDE 0.4 MG/ML
INJECTION, SOLUTION INTRAMUSCULAR; INTRAVENOUS; SUBCUTANEOUS PRN
Status: DISCONTINUED | OUTPATIENT
Start: 2024-11-11 | End: 2024-11-11 | Stop reason: HOSPADM

## 2024-11-11 RX ORDER — SODIUM CHLORIDE 9 MG/ML
INJECTION, SOLUTION INTRAVENOUS CONTINUOUS
Status: DISCONTINUED | OUTPATIENT
Start: 2024-11-11 | End: 2024-11-11 | Stop reason: HOSPADM

## 2024-11-11 RX ORDER — LIDOCAINE HYDROCHLORIDE 10 MG/ML
INJECTION, SOLUTION EPIDURAL; INFILTRATION; INTRACAUDAL; PERINEURAL PRN
Status: DISCONTINUED | OUTPATIENT
Start: 2024-11-11 | End: 2024-11-11 | Stop reason: ALTCHOICE

## 2024-11-11 RX ORDER — PHENYLEPHRINE HYDROCHLORIDE 25 MG/ML
1 SOLUTION/ DROPS OPHTHALMIC SEE ADMIN INSTRUCTIONS
Status: DISCONTINUED | OUTPATIENT
Start: 2024-11-11 | End: 2024-11-11 | Stop reason: HOSPADM

## 2024-11-11 RX ORDER — TETRACAINE HYDROCHLORIDE 5 MG/ML
SOLUTION OPHTHALMIC PRN
Status: DISCONTINUED | OUTPATIENT
Start: 2024-11-11 | End: 2024-11-11 | Stop reason: ALTCHOICE

## 2024-11-11 RX ORDER — PROPARACAINE HYDROCHLORIDE 5 MG/ML
1 SOLUTION/ DROPS OPHTHALMIC SEE ADMIN INSTRUCTIONS
Status: DISCONTINUED | OUTPATIENT
Start: 2024-11-11 | End: 2024-11-11 | Stop reason: HOSPADM

## 2024-11-11 RX ORDER — TROPICAMIDE 10 MG/ML
1 SOLUTION/ DROPS OPHTHALMIC SEE ADMIN INSTRUCTIONS
Status: DISCONTINUED | OUTPATIENT
Start: 2024-11-11 | End: 2024-11-11 | Stop reason: HOSPADM

## 2024-11-11 RX ORDER — TETRACAINE HYDROCHLORIDE 5 MG/ML
1 SOLUTION OPHTHALMIC SEE ADMIN INSTRUCTIONS
Status: DISCONTINUED | OUTPATIENT
Start: 2024-11-11 | End: 2024-11-11 | Stop reason: HOSPADM

## 2024-11-11 RX ORDER — LISINOPRIL 5 MG/1
5 TABLET ORAL DAILY
COMMUNITY

## 2024-11-11 RX ADMIN — PHENYLEPHRINE HYDROCHLORIDE 1 DROP: 25 SOLUTION/ DROPS OPHTHALMIC at 08:10

## 2024-11-11 RX ADMIN — TETRACAINE HYDROCHLORIDE 1 DROP: 5 SOLUTION OPHTHALMIC at 08:43

## 2024-11-11 RX ADMIN — FENTANYL CITRATE 50 MCG: 50 INJECTION INTRAMUSCULAR; INTRAVENOUS at 08:53

## 2024-11-11 RX ADMIN — TROPICAMIDE 1 DROP: 10 SOLUTION/ DROPS OPHTHALMIC at 08:19

## 2024-11-11 RX ADMIN — PHENYLEPHRINE HYDROCHLORIDE 1 DROP: 25 SOLUTION/ DROPS OPHTHALMIC at 08:19

## 2024-11-11 RX ADMIN — FENTANYL CITRATE 50 MCG: 50 INJECTION INTRAMUSCULAR; INTRAVENOUS at 08:50

## 2024-11-11 RX ADMIN — TROPICAMIDE 1 DROP: 10 SOLUTION/ DROPS OPHTHALMIC at 08:10

## 2024-11-11 RX ADMIN — TROPICAMIDE 1 DROP: 10 SOLUTION/ DROPS OPHTHALMIC at 08:25

## 2024-11-11 RX ADMIN — PROPARACAINE HYDROCHLORIDE 1 DROP: 5 SOLUTION/ DROPS OPHTHALMIC at 08:25

## 2024-11-11 RX ADMIN — PROPARACAINE HYDROCHLORIDE 1 DROP: 5 SOLUTION/ DROPS OPHTHALMIC at 08:10

## 2024-11-11 RX ADMIN — PHENYLEPHRINE HYDROCHLORIDE 1 DROP: 25 SOLUTION/ DROPS OPHTHALMIC at 08:25

## 2024-11-11 RX ADMIN — PROPARACAINE HYDROCHLORIDE 1 DROP: 5 SOLUTION/ DROPS OPHTHALMIC at 08:19

## 2024-11-11 ASSESSMENT — PAIN - FUNCTIONAL ASSESSMENT
PAIN_FUNCTIONAL_ASSESSMENT: 0-10
PAIN_FUNCTIONAL_ASSESSMENT: NONE - DENIES PAIN

## 2024-11-11 NOTE — PROGRESS NOTES
Patient verbalizes readiness for discharge. Discharge instructions given to patient and responsible adult, answered all questions, and verbalized understanding of discharge instructions.         IV Sedation Discharge Criteria    Inpatients must meet Criteria 1 through 7. All other patients are either YES or N/A. If a NO is chosen then Surgeon must be notified.      1.  Minimum 30 minutes after last dose of sedative medication, minimum 120 minutes after last dose of reversal agent.    Yes      2.  Systolic BP stable within 20 mmHg for 30 minutes & systolic BP between 90 & 180 or within 10 mmHg of baseline.    Yes      3.  Pulse between 60 and 100 or within 10 bpm of baseline.    Yes, within baseline.      4.  Spontaneous respiratory rate >/= 10 per minute.    Yes      5.  SaO2 >/= 95 or  >/= baseline.    Yes      6.  Able to cough and swallow or return to baseline function.    Yes      7.  Alert and oriented or return to baseline mental status.    Yes      8.  Demonstrates controlled, coordinated movements, ambulates with steady gait, or return to baseline activity function.    Yes      9.  Minimal or no pain or nausea, or at a level tolerable and acceptable to patient.    Yes      10. Takes and retains oral fluids as allowed.    Yes      11. Procedural / perioperative site stable.  Minimal or no bleeding.    Yes          12. If GI endoscopy procedure, minimal or no abdominal distention or passing flatus.    N/A      13. Written discharge instructions and emergency telephone number provided.    Yes      14. Accompanied by a responsible adult.    Yes

## 2024-11-11 NOTE — ANESTHESIA PRE PROCEDURE
Department of Anesthesiology  Preprocedure Note       Name:  Vicente Mello   Age:  73 y.o.  :  1950                                          MRN:  [id number]         Date:  2024      Surgeon: Surgeon(s):  Jasiel Luna DO    Procedure: Procedure(s):  EYE CATARACT EMULSIFICATION INTRAOCULAR LENS IMPLANT    Medications prior to admission:   Prior to Admission medications    Medication Sig Start Date End Date Taking? Authorizing Provider   Multiple Vitamin (MULTIVITAMIN ADULT PO) Take 1 tablet by mouth daily   Yes Jenna Murillo MD   esomeprazole Magnesium (NEXIUM) 40 MG PACK Take 1 packet by mouth daily   Yes Jenna Murillo MD   Multiple Vitamins-Minerals (PRESERVISION AREDS PO) Take 1 tablet by mouth daily   Yes Jenna Murlilo MD   lisinopril (PRINIVIL;ZESTRIL) 5 MG tablet Take 1 tablet by mouth daily   Yes Jenna Murillo MD   Potassium 99 MG TABS Take 1 tablet by mouth daily   Yes Jenna Murillo MD   Vitamin D3 125 MCG (5000 UT) TABS tablet Take 1 tablet by mouth daily   Yes Jenna Murillo MD   metFORMIN (GLUCOPHAGE) 500 MG tablet Take 1 tablet by mouth 2 times daily (with meals)   Yes Jenna Murillo MD   tadalafil (CIALIS) 5 MG tablet Take 1 tablet by mouth as needed for Erectile Dysfunction   Yes ProviderJenna MD   vitamin C (ASCORBIC ACID) 500 MG tablet Take 1 tablet by mouth daily    ProviderJenna MD   vitamin E 400 UNIT capsule Take 1 capsule by mouth daily    Jenna Murillo MD   aluminum sulfate-calcium acetate (DOMEBORO) 25 % PACK Apply 1 packet topically 3 times daily    Jenna Murillo MD       Current medications:    Current Facility-Administered Medications   Medication Dose Route Frequency Provider Last Rate Last Admin    lactated ringers infusion   IntraVENous Continuous Meche Field, APRN - CRNA        0.9 % sodium chloride infusion   IntraVENous Continuous Jasiel Luna DO        sodium chloride flush

## 2024-11-11 NOTE — ANESTHESIA POSTPROCEDURE EVALUATION
Department of Anesthesiology  Postprocedure Note    Patient: Vicente Mello  MRN: [id number]  YOB: 1950  Date of evaluation: 11/11/2024    Procedure Summary       Date: 11/11/24 Room / Location: [address]    Anesthesia Start: 0848 Anesthesia Stop: 0913    Procedure: EYE CATARACT EMULSIFICATION INTRAOCULAR LENS IMPLANT (Right: Eye) Diagnosis:       Combined forms of age-related cataract of right eye      (Combined forms of age-related cataract of right eye [H25.811])    Surgeons: Jasiel Luna DO Responsible Provider: Jorge Alberto Rayo APRN - CRNA    Anesthesia Type: MAC ASA Status: 2            Anesthesia Type: No value filed.    Rei Phase I: Rei Score: 10    Rei Phase II: Rei Score: 10    Anesthesia Post Evaluation    Patient location during evaluation: bedside  Patient participation: complete - patient participated  Level of consciousness: awake and alert  Pain score: 0  Airway patency: patent  Nausea & Vomiting: no nausea and no vomiting  Cardiovascular status: hemodynamically stable  Respiratory status: acceptable  Hydration status: stable  Multimodal analgesia pain management approach  Pain management: adequate    No notable events documented.

## 2024-11-11 NOTE — H&P
I have examined the patient and reviewed the history and physical completed on 11/10/24 and find no relevant changes.    I have reviewed with the patient and/or family the risks, benefits, and alternatives to the procedure and they have agreed to proceed.    Jasiel Luna DO  11/11/2024  8:37 AM

## 2024-11-11 NOTE — H&P
Vicente Mello is a 73 y.o. year old who presents for elective outpatient ophthalmic surgery with Jasiel Luna DO.  The patient complains of decreased vision, glare and halos around lights, and trouble with vision for activities of daily living.      Review of systems  Positive for decreased vision, glare and halos around lights, and trouble with activities of daily living.      All other review of systems were negative.    Past Medical History:   Diagnosis Date    Diabetes mellitus (HCC)     Erectile dysfunction     GERD (gastroesophageal reflux disease)     Prostate disease        Past Surgical History:   Procedure Laterality Date    ACHILLES TENDON SURGERY      CHOLECYSTECTOMY      EYE SURGERY Left 9/9/2024    EYE CATARACT EMULSIFICATION INTRAOCULAR LENS IMPLANT performed by Jasiel Luna DO at [facility] OR    INGUINAL HERNIA REPAIR Bilateral     LEG SURGERY      ROTATOR CUFF REPAIR Right     x2    ROTATOR CUFF REPAIR Left     SHOULDER ARTHROSCOPY Right     pedro inserted    UMBILICAL HERNIA REPAIR         Home meds:   Prior to Admission medications    Medication Sig Start Date End Date Taking? Authorizing Provider   metFORMIN (GLUCOPHAGE) 500 MG tablet Take 1 tablet by mouth 2 times daily (with meals)    Jenna Murillo MD   vitamin C (ASCORBIC ACID) 500 MG tablet Take 1 tablet by mouth daily    Jenna Murillo MD   vitamin E 400 UNIT capsule Take 1 capsule by mouth daily    Jenna Murillo MD   aluminum sulfate-calcium acetate (DOMEBORO) 25 % PACK Apply 1 packet topically 3 times daily    Jenna Murillo MD   tadalafil (CIALIS) 5 MG tablet Take 1 tablet by mouth as needed for Erectile Dysfunction    ProviderJenna MD     Scheduled Meds:  Continuous Infusions:  PRN Meds:.    No Known Allergies    There were no vitals filed for this visit.    PHYSICAL EXAMINATION    Gen: NAD  HEENT: BCVA= 20/30, Glare testing= 20/400, Cataract severity/type-2+ Combined Forms of

## 2025-04-24 NOTE — OP NOTE
OPERATIVE NOTE      Patient:  Vicente Mello    YOB: 1950    Account #:  [id number]    Date:  11/11/2024    Surgeon:  Jasiel Luna DO    Primary Care Physician:Silvio Malagon MD      Preoperative Diagnosis: Combined forms of Age-Related Cataract- right eye    Postoperative Diagnosis: Same    Procedure: Phacoemulsification with intraocular lens implantation, right eye    Anesthesia: Topical and intracameral anesthesia with intravenous sedation    Complications: none    Specimens: none    Indications for procedure:  The patient is a 73 y.o. year old with decreased vision, glare and halos around lights, and trouble with activities of daily living.  Examination revealed a visually significant cataract in the right eye.  Other eye diseases have been ruled out as the primary cause of decreased visual function.  Significant improvement is expected in the patient's visual acuity and/or visual function from the removal of the cataract.  Risks, benefits, and alternatives to surgery were discussed with the patient and the patient elected to proceed with phacoemulsification with lens implantation.    Details of the procedure:  Following informed consent, the patient was identified by name and identification tag in the holding area,taken to the operating room and placed in the supine position.  A time out was performed by all present in the room to identify the patient, the eye to be operated on, the correct operative procedure, and the correct intraocular lens.  Monitoring leads were placed.  The patient was positioned.  An eyelid prep of half-strength Betadine was performed.  The patient was administered intravenous sedation if desired and topical anesthetic was placed in the operative eye.  The eye prepped a second time and draped in the usual sterile fashion using aseptic technique for cataract surgery.  A lid speculum was then inserted.  Ocucoat was placed onto the corneal surface.  A stab incision  PRE-OP DIAGNOSIS:  Bilateral kidney stones 24-Apr-2025 12:08:59  Geovanny Cabrera  BPH with urinary obstruction 24-Apr-2025 12:09:26  Geovanny Cabrera  Ureteral stricture, left 24-Apr-2025 12:10:07  Geovanny Cabrera

## (undated) DEVICE — SOFT SHIELD® COLLAGEN SHIELD, 12 HOURS (CE): Brand: SOFT SHIELD® COLLAGEN SHIELDS

## (undated) DEVICE — SOLUTION IRRIG 1000ML 0.9% SOD CHL USP POUR PLAS BTL

## (undated) DEVICE — GLOVE SURG SZ 7 CRM LTX FREE POLYISOPRENE POLYMER BEAD ANTI

## (undated) DEVICE — AMVISC PLUS  0.8ML: Brand: AMVISC PLUS

## (undated) DEVICE — Device

## (undated) DEVICE — BETADINE 5% EYE SOL

## (undated) DEVICE — SOLUTION IRRIG 1000ML STRL H2O USP PLAS POUR BTL

## (undated) DEVICE — Device: Brand: ALLEGRO 1X SILICONE I/A HANDPIECE (6)

## (undated) DEVICE — GLOVE SURG SZ 65 CRM LTX FREE POLYISOPRENE POLYMER BEAD ANTI

## (undated) DEVICE — KNIFE OPHTH DIA22MM 45DEG SLT W HNDL SHRP ANG PNT DEL DBL